# Patient Record
Sex: FEMALE | Race: WHITE | URBAN - METROPOLITAN AREA
[De-identification: names, ages, dates, MRNs, and addresses within clinical notes are randomized per-mention and may not be internally consistent; named-entity substitution may affect disease eponyms.]

---

## 2017-08-10 ENCOUNTER — HISTORICAL (OUTPATIENT)
Dept: ADMINISTRATIVE | Facility: HOSPITAL | Age: 37
End: 2017-08-10

## 2017-08-10 LAB
BILIRUB SERPL-MCNC: NEGATIVE MG/DL
BLOOD URINE, POC: NORMAL
CLARITY, POC UA: CLEAR
COLOR, POC UA: YELLOW
GLUCOSE UR QL STRIP: NEGATIVE
KETONES UR QL STRIP: NEGATIVE
LEUKOCYTE EST, POC UA: NEGATIVE
NITRITE, POC UA: NEGATIVE
PH, POC UA: 6
POC BETA-HCG (QUAL): NEGATIVE
PROTEIN, POC: NEGATIVE
SPECIFIC GRAVITY, POC UA: 1.01
UROBILINOGEN, POC UA: NORMAL

## 2017-08-12 LAB — FINAL CULTURE: NO GROWTH

## 2018-03-20 LAB
BILIRUB SERPL-MCNC: NEGATIVE MG/DL
BLOOD URINE, POC: NEGATIVE
CLARITY, POC UA: CLEAR
COLOR, POC UA: NORMAL
GLUCOSE UR QL STRIP: NEGATIVE
KETONES UR QL STRIP: NEGATIVE
LEUKOCYTE EST, POC UA: NEGATIVE
NITRITE, POC UA: NEGATIVE
PH, POC UA: 7.5
POC BETA-HCG (QUAL): NEGATIVE
PROTEIN, POC: NEGATIVE
SPECIFIC GRAVITY, POC UA: 1.01
UROBILINOGEN, POC UA: NORMAL

## 2020-10-20 ENCOUNTER — HISTORICAL (OUTPATIENT)
Dept: RADIOLOGY | Facility: HOSPITAL | Age: 40
End: 2020-10-20

## 2022-04-10 ENCOUNTER — HISTORICAL (OUTPATIENT)
Dept: ADMINISTRATIVE | Facility: HOSPITAL | Age: 42
End: 2022-04-10

## 2022-04-24 VITALS
OXYGEN SATURATION: 98 % | WEIGHT: 125.69 LBS | SYSTOLIC BLOOD PRESSURE: 133 MMHG | HEIGHT: 64 IN | DIASTOLIC BLOOD PRESSURE: 76 MMHG | BODY MASS INDEX: 21.46 KG/M2

## 2022-05-03 NOTE — HISTORICAL [facility] CERNER
This is a historical note converted from [employer]. Formatting and pictures may have been removed.  Please reference [employer] for original formatting and attached multimedia. Chief Complaint  Abdominal pain x 3 days; flu-like symptoms- fever, weakness, muscle aches, fatigue; pt has taken advil for sx  History of Present Illness  Patient is a 36-year-old  female that presents today with flulike symptoms and abdominal pain?in the suprapubic area.? Urine was negative patient does not?report taking anything over-the-counter for urine.  Review of Systems  Constitutional: negative except as stated in HPI  Eye: negative except as stated in HPI  ENMT: negative except as stated in HPI  Respiratory: negative except as stated in HPI  Cardiovascular: negative except as stated in HPI  Gastrointestinal: negative except as stated in HPI  Genitourinary: negative except as stated in HPI  Hema/Lymph: negative except as stated in HPI  Endocrine: negative except as stated in HPI  Immunologic: negative except as stated in HPI  Musculoskeletal: negative except as stated in HPI  Integumentary: negative except as stated in HPI  Neurologic: negative except as stated in HPI  All Other ROS_ ?negative except as stated in HPI  ?  ?  ?  ?  Physical Exam  Vitals & Measurements  T:?36.8? ?C ?(Oral)? HR:?65?(Peripheral)? BP:?119/77? SpO2:?98%?  HT:?163?cm? HT:?163?cm? WT:?57?kg? WT:?57?kg? BMI:?21.45?  General: Alert and oriented, No acute distress.  Eye: Pupils are equal, round and reactive to light, Extraocular movements are intact.  HENT: Normocephalic.  Respiratory: Lungs are clear to auscultation, Respirations are non-labored, Breath sounds are equal, Symmetrical chest wall expansion.  Cardiovascular: Normal rate, Regular rhythm, No murmur.  Gastrointestinal: Soft, non-tender, Non-distended, Normal bowel sounds. Negative CVA tenderness.? No rebound tenderness. ?Negative psoas.  Musculoskeletal: Normal range of motion.  Integumentary: Warm,  Dry, Intact.  Neurologic: No focal deficits.  ?  ?  ?  Assessment/Plan  1.?Abdominal pain,?Abdominal pain  Stool softener  Ordered:  After Hrs Visit [id number] PC, Abdominal pain  Flu-like symptoms, 08/10/17 19:40:00 CDT  Office/Outpatient Visit Level 3 New [id number] PC, Abdominal pain  Flu-like symptoms, 08/10/17 19:40:00 CDT  Urine Culture [id number], Routine collect, 08/10/17 19:40:00 CDT, Urine, Nurse collect, Stop date 08/10/17 19:40:00 CDT, Abdominal pain  Flu-like symptoms  ?  Flu-like symptoms  Ordered:  After Hrs Visit [id number] PC, Abdominal pain  Flu-like symptoms, 08/10/17 19:40:00 CDT  Flu A/B POC PC, 08/10/17 19:05:00 CDT  Office/Outpatient Visit Level 3 New [id number] PC, Abdominal pain  Flu-like symptoms, 08/10/17 19:40:00 CDT  Urine Culture [id number], Routine collect, 08/10/17 19:40:00 CDT, Urine, Nurse collect, Stop date 08/10/17 19:40:00 CDT, Abdominal pain  Flu-like symptoms  ?  We will send urine for culture.? Patient instructed to?evacuate bowels. ?ER precautions if right lower abdomen pain?starts back up.   Problem List/Past Medical History  No chronic problems  Historical  No historical problems  Medications  Levora 0.15 mg-30 mcg oral tab, 1 tab(s), Oral, Daily,? ?Not taking  MONONESSA TAB, 1 tab(s), Oral, Daily  Allergies  Biaxin  Social History  Alcohol  Current, Wine, 1-2 times per week  Employment/School  Employed  Exercise  Exercise frequency: 3-4 times/week.  Home/Environment  Lives with Children, Mother.  Nutrition/Health  gluten free  Sexual  Sexually active: Yes.  Substance Abuse  Never  Tobacco  Never smoker

## 2022-09-21 ENCOUNTER — HISTORICAL (OUTPATIENT)
Dept: ADMINISTRATIVE | Facility: HOSPITAL | Age: 42
End: 2022-09-21